# Patient Record
Sex: FEMALE | Race: WHITE
[De-identification: names, ages, dates, MRNs, and addresses within clinical notes are randomized per-mention and may not be internally consistent; named-entity substitution may affect disease eponyms.]

---

## 2020-02-16 ENCOUNTER — HOSPITAL ENCOUNTER (EMERGENCY)
Dept: HOSPITAL 25 - UCCORT | Age: 65
Discharge: HOME | End: 2020-02-16
Payer: MEDICARE

## 2020-02-16 VITALS — DIASTOLIC BLOOD PRESSURE: 71 MMHG | SYSTOLIC BLOOD PRESSURE: 172 MMHG

## 2020-02-16 DIAGNOSIS — I10: ICD-10-CM

## 2020-02-16 DIAGNOSIS — R31.9: ICD-10-CM

## 2020-02-16 DIAGNOSIS — Z79.82: ICD-10-CM

## 2020-02-16 DIAGNOSIS — N39.0: Primary | ICD-10-CM

## 2020-02-16 DIAGNOSIS — Z79.899: ICD-10-CM

## 2020-02-16 DIAGNOSIS — Z87.891: ICD-10-CM

## 2020-02-16 PROCEDURE — 81003 URINALYSIS AUTO W/O SCOPE: CPT

## 2020-02-16 PROCEDURE — 99212 OFFICE O/P EST SF 10 MIN: CPT

## 2020-02-16 PROCEDURE — G0463 HOSPITAL OUTPT CLINIC VISIT: HCPCS

## 2020-02-16 PROCEDURE — 87086 URINE CULTURE/COLONY COUNT: CPT

## 2020-02-16 NOTE — UC
Complaint Female HPI





- HPI Summary


HPI Summary: 


Patient is a 65yo female presenting with "raging UTI" since last night. Notes 

urinary frequency, urgency, hematuria, and dysuria. Notes lower abdominal 

pressure. Denies flank pain. Denies n/v. Denies fever and chills. Denies taking 

anything for symptom relief but states increased fluid intake helping some.








- History Of Current Complaint


Chief Complaint: UCGU


Stated Complaint: URINARY


Hx Obtained From: Patient


Pain Intensity: 0





- Allergies/Home Medications


Allergies/Adverse Reactions: 


 Allergies











Allergy/AdvReac Type Severity Reaction Status Date / Time


 


No Known Allergies Allergy   Verified 02/16/20 08:46











Home Medications: 


 Home Medications





Aspirin EC TAB* [Ecotrin EC Low Dose 81 MG*] 81 mg PO QPM 02/16/20 [History 

Confirmed 02/16/20]


Hydrochlorothiazide TAB* [Hydrodiuril TAB*] 25 mg PO QAM 02/16/20 [History 

Confirmed 02/16/20]


Irbesartan 300 mg PO QPM 02/16/20 [History Confirmed 02/16/20]


Nebivolol (NF) [Bystolic (NF)] 10 mg PO QPM 02/16/20 [History Confirmed 02/16/20

]


amLODIPine TAB* [Norvasc 5 mg TAB*] 5 mg PO QPM 02/16/20 [History Confirmed 02/ 16/20]











PMH/Surg Hx/FS Hx/Imm Hx


Cardiovascular History: Hypertension





- Social History


Alcohol Use: None


Substance Use Type: None


Smoking Status (MU): Former Smoker


Length of Time of Smoking/Using Tobacco: 1 PPD x 20 Years


When Did the Patient Quit Smoking/Using Tobacco: ~1995





Review of Systems


All Other Systems Reviewed And Are Negative: Yes


Constitutional: Positive: Negative


Respiratory: Positive: Negative


Cardiovascular: Positive: Negative


Gastrointestinal: Positive: Abdominal Pain - lower abdominal pressure.  Negative

: Vomiting, Nausea


Genitourinary: Positive: Dysuria, Hematuria, Frequency, Urgency


Musculoskeletal: Positive: Negative





Physical Exam





- Summary


Physical Exam Summary: 


Vital Signs Reviewed: Yes


A+Ox3, no distress


Eyes: Conjunctiva Clear


ENT: Hearing grossly normal


Neck: Positive: Supple


Respiratory: Positive: No respiratory distress, No accessory muscle use + CTA 

throughout  no w/r


Cardiovascular: RRR  nl s1, s2  no m/r  CBT <2  sec


Abd: soft nt/nd  no guarding, no CVA tenderness


Musculoskeletal Exam: ANTONIO x 4 without difficulty


Neurological: Positive: Alert


Psychological: Positive: age appropriate behavior


Skin: Positive: no rash, no ecchymosis








Vital Signs: 


 Initial Vital Signs











Temp  98.2 F   02/16/20 08:44


 


Pulse  76   02/16/20 08:44


 


Resp  16   02/16/20 08:44


 


BP  172/71   02/16/20 08:44


 


Pulse Ox  99   02/16/20 08:44








 Lab Results











  02/16/20 Range/Units





  08:58 


 


POC Urine Color  Pink  


 


POC Urine Clarity  Clear  


 


POC Urine pH  7.5  (5-9)  


 


POC Ur Specif Gravity  1.010  (1.010-1.030)  


 


POC Urine Protein  2+ A  (Negative)  


 


POC Ur Glucose (UA)  Negative  (Negative)  


 


POC Urine Ketones  Negative  (Negative)  


 


POC Urine Blood  3+ A  (Negative)  


 


POC Urine Nitrite  Negative  (Negative)  


 


POC Urine Bilirubin  Negative  (Negative)  


 


POC Urine Urobilinogen  0.2  (Negative)  


 


POC U Leukocyte Esteras  3+ A  (Negative)  














 Complaint Female Dx





- Course


Course Of Treatment: 


UA positive 3+leuks, 3+blood, and 2+ protein. I treated patient with macrobid 

and instructed to take Azo for symptom relief. Instructed to return or follow 

up with pcp if symptoms worsening or not resolving within 1-2 days. Patient 

voiced understanding and agreed with treatment plan.








- Differential Dx/Diagnosis


Provider Diagnosis: 


 UTI (urinary tract infection)








Discharge ED





- Sign-Out/Discharge


Documenting (check all that apply): Patient Departure


All imaging exams completed and their final reports reviewed: No Studies





- Discharge Plan


Condition: Stable


Disposition: HOME


Prescriptions: 


Nitrofurantoin Monohyd/M-Cryst [Macrobid 100 mg Capsule] 100 mg PO BID #10 cap


Patient Education Materials:  Urinary Tract Infection in Women (ED)


Referrals: 


Steph Tompkins PA [Primary Care Provider] - If Needed


Additional Instructions: 


Take Macrobid for treatment of your UTI.


You may also take Azo over the counter as needed for symptomatic relief. 


Increase your fluid intake.


Follow up with your PCP if symptoms do not resolve.


Return or go to emergency room with any new or worsening symptoms.








- Billing Disposition and Condition


Condition: STABLE


Disposition: Home